# Patient Record
Sex: FEMALE | Race: WHITE | HISPANIC OR LATINO | Employment: UNEMPLOYED | URBAN - METROPOLITAN AREA
[De-identification: names, ages, dates, MRNs, and addresses within clinical notes are randomized per-mention and may not be internally consistent; named-entity substitution may affect disease eponyms.]

---
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
## 2021-02-23 ENCOUNTER — PATIENT MESSAGE (OUTPATIENT)
Dept: RHEUMATOLOGY | Facility: CLINIC | Age: 21
End: 2021-02-23

## 2021-03-24 ENCOUNTER — OFFICE VISIT (OUTPATIENT)
Dept: MEDICAL GROUP | Facility: IMAGING CENTER | Age: 21
End: 2021-03-24
Payer: COMMERCIAL

## 2021-03-24 VITALS
HEART RATE: 64 BPM | SYSTOLIC BLOOD PRESSURE: 100 MMHG | TEMPERATURE: 98.2 F | BODY MASS INDEX: 27.64 KG/M2 | WEIGHT: 156 LBS | OXYGEN SATURATION: 97 % | DIASTOLIC BLOOD PRESSURE: 64 MMHG | HEIGHT: 63 IN | RESPIRATION RATE: 12 BRPM

## 2021-03-24 DIAGNOSIS — Z13.1 SCREENING FOR DIABETES MELLITUS (DM): ICD-10-CM

## 2021-03-24 DIAGNOSIS — F33.2 SEVERE EPISODE OF RECURRENT MAJOR DEPRESSIVE DISORDER, WITHOUT PSYCHOTIC FEATURES (HCC): ICD-10-CM

## 2021-03-24 DIAGNOSIS — Z13.0 SCREENING FOR DEFICIENCY ANEMIA: ICD-10-CM

## 2021-03-24 DIAGNOSIS — F41.9 ANXIETY: ICD-10-CM

## 2021-03-24 PROCEDURE — 99214 OFFICE O/P EST MOD 30 MIN: CPT | Performed by: NURSE PRACTITIONER

## 2021-03-24 RX ORDER — ESCITALOPRAM OXALATE 10 MG/1
10 TABLET ORAL DAILY
Qty: 90 TABLET | Refills: 0 | Status: SHIPPED | OUTPATIENT
Start: 2021-03-24 | End: 2022-03-23

## 2021-03-24 ASSESSMENT — PATIENT HEALTH QUESTIONNAIRE - PHQ9
SUM OF ALL RESPONSES TO PHQ QUESTIONS 1-9: 19
CLINICAL INTERPRETATION OF PHQ2 SCORE: 3
5. POOR APPETITE OR OVEREATING: 3 - NEARLY EVERY DAY

## 2021-03-24 ASSESSMENT — ANXIETY QUESTIONNAIRES
GAD7 TOTAL SCORE: 18
5. BEING SO RESTLESS THAT IT IS HARD TO SIT STILL: NOT AT ALL
6. BECOMING EASILY ANNOYED OR IRRITABLE: NEARLY EVERY DAY
2. NOT BEING ABLE TO STOP OR CONTROL WORRYING: NEARLY EVERY DAY
4. TROUBLE RELAXING: NEARLY EVERY DAY
1. FEELING NERVOUS, ANXIOUS, OR ON EDGE: NEARLY EVERY DAY
3. WORRYING TOO MUCH ABOUT DIFFERENT THINGS: NEARLY EVERY DAY
7. FEELING AFRAID AS IF SOMETHING AWFUL MIGHT HAPPEN: NEARLY EVERY DAY

## 2021-03-24 ASSESSMENT — PAIN SCALES - GENERAL: PAINLEVEL: NO PAIN

## 2021-03-24 NOTE — PROGRESS NOTES
Subjective:   CC: Depression (worsening ) and Anxiety    HPI:   Herman presents today to discuss:    Severe episode of recurrent major depressive disorder, without psychotic features (HCC)  Reports that she has noticed worsening in her mood since October 2020 and would like to discuss starting medication to manage mood today.  States that she has seen a counselor regularly.  States that her counselor mentioned it might help her stabilize her mood to start medication for anxiety and depression.  States that she is noticing swings in her mood and it is affecting her relationships with family and friends.  States that she becomes angry and quickly depressed, as well as, feeling anxiety and than sadness.  States that she has had thoughts of hurting herself or others,  but denies having a plan at this time.  Reports history of attempting to hurt herself.  Denies any past hospitalization due to mood.  Denies history of bipolar and/or bipolar disease in the family.     Depression Screening  Little interest or pleasure in doing things?  1 - several days   Feeling down, depressed , or hopeless? 2 - more than half the days   Trouble falling or staying asleep, or sleeping too much?  3 - nearly every day   Feeling tired or having little energy?  3 - nearly every day   Poor appetite or overeating?  3 - nearly every day   Feeling bad about yourself - or that you are a failure or have let yourself or your family down? 3 - nearly every day   Trouble concentrating on things, such as reading the newspaper or watching television? 2 - more than half the days   Moving or speaking so slowly that other people could have noticed.  Or the opposite - being so fidgety or restless that you have been moving around a lot more than usual?  0 - not at all   Thoughts that you would be better off dead, or of hurting yourself?  2 - more than half the days   Patient Health Questionnaire Score: 19     If depressive symptoms identified deferred to  follow up visit unless specifically addressed in assesment and plan.    Interpretation of PHQ-9 Total Score   Score Severity   1-4 No Depression   5-9 Mild Depression   10-14 Moderate Depression   15-19 Moderately Severe Depression   20-27 Severe Depression    PJ-7 Questionnaire  Feeling nervous, anxious, or on edge: Nearly every day  Not being able to sop or control worrying: Nearly every day  Worrying too much about different things: Nearly every day  Trouble relaxing: Nearly every day  Being so restless that it's hard to sit still: Not at all  Becoming easily annoyed or irritable: Nearly every day  Feeling afraid as if something awful might happen: Nearly every day  Total: 18    Interpretation of PJ 7 Total Score   Score Severity :  0-4 No Anxiety   5-9 Mild Anxiety  10-14 Moderate Anxiety  15-21 Severe Anxiety        Past Medical History:   Diagnosis Date   • Allergy     spring     Social History     Tobacco Use   • Smoking status: Never Smoker   • Smokeless tobacco: Never Used   Substance Use Topics   • Alcohol use: Never   • Drug use: Never     Current Outpatient Medications Ordered in Epic   Medication Sig Dispense Refill   • escitalopram (LEXAPRO) 10 MG Tab Take 1 tablet by mouth every day. 90 tablet 0   • levonorgestrel-ethinyl estradiol (AVIANE) 0.1-20 MG-MCG per tablet TAKE 1 TABLET BY MOUTH EVERY DAY 84 Tab 2   • Probiotic Product (PROBIOTIC PO) Take  by mouth.       No current Epic-ordered facility-administered medications on file.      ROS:  Constitutional: Denies fever, chills, night sweats, weight loss/gain or malaise/fatigue.   HENT: Denies nasal congestion, sore throat, hearing loss, enlarged thyroid, or difficulty swallowing.   Eyes: Denies changes in vision, pain.   Respiratory: Denies cough, SOB at rest or activity.    Cardiovascular: Denies tachycardia, chest pain, palpitations, or leg swelling.   Gastrointestinal: Denies N/V/C/D, abdominal pain, loss appetite, reflux, or  "hematochezia.  Genitourinary: Denies difficulty voiding, dysuria, nocturia, or hematuria. History of painful menstrual cycle with heavy flow.  Skin: Negative for rash or worrisome moles.   Neurological: Negative for dizziness, focal weakness and headaches.   Endo/Heme/Allergies: Denies bruise/bleed easily, allergies.   Psychiatric/Behavioral: Denies difficulty sleeping. Depression and anxiety, see HPI.    Objective:   Exam:  /64   Pulse 64   Temp 36.8 °C (98.2 °F)   Resp 12   Ht 1.6 m (5' 3\")   Wt 70.8 kg (156 lb)   LMP 03/18/2021 (Exact Date)   SpO2 97%   BMI 27.63 kg/m²  Body mass index is 27.63 kg/m².  General: Normal appearing. No distress.  HEENT: Normocephalic. Eyes conjunctiva clear lids without ptosis, PERRLA, ears normal shape and contour. Oral, ears, and nasal examine deferred due to current COVID-19 outbreak, no acute concerns. Patient wore a mask during visit.  Neck: Trachea midline, no masses, no thyromegaly.  Pulmonary:  Unlabored respiratory effort, no cough.  Neurologic: Grossly non-focal.  Skin: Warm and dry. No obvious lesions.  Musculoskeletal: Normal gait. No extremity cyanosis, clubbing, or edema.   Psych: Normal mood and affect. Alert and oriented x3. Judgment and insight is normal.    Assessment & Plan:   1. Anxiety  2. Severe episode of recurrent major depressive disorder, without psychotic features (HCC)  This is a stable condition. Reviewed PJ and PHQ9 results with patient, verbalized understanding. Discussed the benefit of improving diet and exercise regimen,  verbalized understanding. Discussed the benefit of treating depression and risk of not treating ongoing depression with patient. Discussed beside ongoing life stressor as cause of depression, Vitamin D deficiency, anemia, or hypothyroid as possible causes of depression, verbalized understanding. Discussed starting medication therapy today, verbalized understanding and desire. Discussed the importance of adjunctive " counseling with treatment and instructed to continue, verbalized understanding and willingness. Instructed to take Lexapro 10 mg daily for 14 days, and then may increase to 20 mg daily if tolerating and symptoms are mildly improved, verbalized understanding. Discussed possible side effects of medication and/or anxiety and depression might get worse before getting better, verbalized understanding. Encouraged to notify a trusted family member or friend of treatment for depression due to possible worsening of depression with treatment. Instructed to RTC if symptoms worsen or medication doesn't start elevate mood within 14 days. Instructed to seek emergency services is she has suicidal/homicide thoughts before returning to clinic.    -     escitalopram (LEXAPRO) 10 MG Tab; Take 1 tablet by mouth every day.  -     CBC WITH DIFFERENTIAL; Future  -     TSH WITH REFLEX TO FT4; Future  -     VITAMIN D,25 HYDROXY; Future    3. Screening for deficiency anemia  4. Screening for diabetes mellitus (DM)  Discussed with patient ruling out anemia as cause of change in mood, as well as, screening for diabetes and assessing electrolytes due to be on oral birth control, verbalized understanding and willingness.  Discussed preventive screening labs with patient, verbalized understanding. Will evaluate plan of care once labs are obtained.    -     Comp Metabolic Panel; Future        -     CBC WITH DIFFERENTIAL; Future    Return in about 4 weeks (around 4/21/2021).    Please note that this dictation was created using voice recognition software. I have made every reasonable attempt to correct obvious errors, but I expect that there are errors of grammar and possibly content that I did not discover before finalizing the note.

## 2021-03-26 PROBLEM — F41.9 ANXIETY: Status: ACTIVE | Noted: 2021-03-26

## 2021-03-26 PROBLEM — F33.2 SEVERE EPISODE OF RECURRENT MAJOR DEPRESSIVE DISORDER, WITHOUT PSYCHOTIC FEATURES (HCC): Status: ACTIVE | Noted: 2021-03-26

## 2021-03-26 NOTE — ASSESSMENT & PLAN NOTE
Reports that she has noticed worsening in her mood since October 2020 and would like to discuss starting medication to manage mood today.  States that she has seen a counselor regularly.  States that her counselor mentioned it might help her stabilize her mood to start medication for anxiety and depression.  States that she is noticing swings in her mood and it is affecting her relationships with family and friends.  States that she becomes angry and quickly depressed, as well as, feeling anxiety and than sadness.  States that she has had thoughts of hurting herself or others,  but denies having a plan at this time.  Reports history of attempting to hurt herself.  Denies any past hospitalization due to mood.  Denies history of bipolar and/or bipolar disease in the family.     Depression Screening  Little interest or pleasure in doing things?  1 - several days   Feeling down, depressed , or hopeless? 2 - more than half the days   Trouble falling or staying asleep, or sleeping too much?  3 - nearly every day   Feeling tired or having little energy?  3 - nearly every day   Poor appetite or overeating?  3 - nearly every day   Feeling bad about yourself - or that you are a failure or have let yourself or your family down? 3 - nearly every day   Trouble concentrating on things, such as reading the newspaper or watching television? 2 - more than half the days   Moving or speaking so slowly that other people could have noticed.  Or the opposite - being so fidgety or restless that you have been moving around a lot more than usual?  0 - not at all   Thoughts that you would be better off dead, or of hurting yourself?  2 - more than half the days   Patient Health Questionnaire Score: 19     If depressive symptoms identified deferred to follow up visit unless specifically addressed in assesment and plan.    Interpretation of PHQ-9 Total Score   Score Severity   1-4 No Depression   5-9 Mild Depression   10-14 Moderate Depression    15-19 Moderately Severe Depression   20-27 Severe Depression    PJ-7 Questionnaire  Feeling nervous, anxious, or on edge: Nearly every day  Not being able to sop or control worrying: Nearly every day  Worrying too much about different things: Nearly every day  Trouble relaxing: Nearly every day  Being so restless that it's hard to sit still: Not at all  Becoming easily annoyed or irritable: Nearly every day  Feeling afraid as if something awful might happen: Nearly every day  Total: 18    Interpretation of PJ 7 Total Score   Score Severity :  0-4 No Anxiety   5-9 Mild Anxiety  10-14 Moderate Anxiety  15-21 Severe Anxiety

## 2021-04-15 ENCOUNTER — PATIENT MESSAGE (OUTPATIENT)
Dept: RESEARCH | Facility: HOSPITAL | Age: 21
End: 2021-04-15

## 2021-04-29 ENCOUNTER — OFFICE VISIT (OUTPATIENT)
Dept: MEDICAL GROUP | Facility: IMAGING CENTER | Age: 21
End: 2021-04-29
Payer: COMMERCIAL

## 2021-04-29 VITALS
SYSTOLIC BLOOD PRESSURE: 100 MMHG | TEMPERATURE: 99.2 F | HEART RATE: 74 BPM | BODY MASS INDEX: 27.82 KG/M2 | OXYGEN SATURATION: 99 % | HEIGHT: 63 IN | WEIGHT: 157 LBS | DIASTOLIC BLOOD PRESSURE: 62 MMHG | RESPIRATION RATE: 16 BRPM

## 2021-04-29 DIAGNOSIS — F33.41 RECURRENT MAJOR DEPRESSIVE DISORDER, IN PARTIAL REMISSION (HCC): ICD-10-CM

## 2021-04-29 DIAGNOSIS — F41.9 ANXIETY: ICD-10-CM

## 2021-04-29 PROCEDURE — 99213 OFFICE O/P EST LOW 20 MIN: CPT | Performed by: NURSE PRACTITIONER

## 2021-04-29 RX ORDER — ESCITALOPRAM OXALATE 10 MG/1
10 TABLET ORAL DAILY
Qty: 90 TABLET | Refills: 2 | Status: SHIPPED | OUTPATIENT
Start: 2021-04-29 | End: 2022-03-23 | Stop reason: SDUPTHER

## 2021-04-29 ASSESSMENT — PAIN SCALES - GENERAL: PAINLEVEL: NO PAIN

## 2021-04-29 ASSESSMENT — ANXIETY QUESTIONNAIRES
4. TROUBLE RELAXING: NOT AT ALL
2. NOT BEING ABLE TO STOP OR CONTROL WORRYING: NOT AT ALL
GAD7 TOTAL SCORE: 0
7. FEELING AFRAID AS IF SOMETHING AWFUL MIGHT HAPPEN: NOT AT ALL
6. BECOMING EASILY ANNOYED OR IRRITABLE: NOT AT ALL
1. FEELING NERVOUS, ANXIOUS, OR ON EDGE: NOT AT ALL
3. WORRYING TOO MUCH ABOUT DIFFERENT THINGS: NOT AT ALL
5. BEING SO RESTLESS THAT IT IS HARD TO SIT STILL: NOT AT ALL

## 2021-04-29 ASSESSMENT — PATIENT HEALTH QUESTIONNAIRE - PHQ9
5. POOR APPETITE OR OVEREATING: 2 - MORE THAN HALF THE DAYS
CLINICAL INTERPRETATION OF PHQ2 SCORE: 1
SUM OF ALL RESPONSES TO PHQ QUESTIONS 1-9: 5

## 2021-05-02 PROBLEM — F33.2 SEVERE EPISODE OF RECURRENT MAJOR DEPRESSIVE DISORDER, WITHOUT PSYCHOTIC FEATURES (HCC): Status: RESOLVED | Noted: 2021-03-26 | Resolved: 2021-05-02

## 2021-05-02 PROBLEM — F33.41 RECURRENT MAJOR DEPRESSIVE DISORDER, IN PARTIAL REMISSION (HCC): Status: ACTIVE | Noted: 2021-05-02

## 2021-05-02 NOTE — ASSESSMENT & PLAN NOTE
Reports that her overall mood has improved since starting Lexapro 10 mg daily.  States she is taking medication at night.  Denies any side effects from medication.  States overall she is happy with her improvement of her mood since starting Lexapro.    Depression Screening  Little interest or pleasure in doing things?  0 - not at all   Feeling down, depressed , or hopeless? 1 - several days   Trouble falling or staying asleep, or sleeping too much?  0 - not at all   Feeling tired or having little energy?  1 - several days   Poor appetite or overeating?  2 - more than half the days   Feeling bad about yourself - or that you are a failure or have let yourself or your family down? 1 - several days   Trouble concentrating on things, such as reading the newspaper or watching television? 0 - not at all   Moving or speaking so slowly that other people could have noticed.  Or the opposite - being so fidgety or restless that you have been moving around a lot more than usual?  0 - not at all   Thoughts that you would be better off dead, or of hurting yourself?  0 - not at all   Patient Health Questionnaire Score: 5     If depressive symptoms identified deferred to follow up visit unless specifically addressed in assesment and plan.    Interpretation of PHQ-9 Total Score   Score Severity   1-4 No Depression   5-9 Mild Depression   10-14 Moderate Depression   15-19 Moderately Severe Depression   20-27 Severe Depression    PJ-7 Questionnaire  Feeling nervous, anxious, or on edge: Not at all  Not being able to sop or control worrying: Not at all  Worrying too much about different things: Not at all  Trouble relaxing: Not at all  Being so restless that it's hard to sit still: Not at all  Becoming easily annoyed or irritable: Not at all  Feeling afraid as if something awful might happen: Not at all  Total: 0    Interpretation of PJ 7 Total Score   Score Severity :  0-4 No Anxiety   5-9 Mild Anxiety  10-14 Moderate Anxiety  15-21  Severe Anxiety    3/24/2021 previous leoncio: 18, PHQ: 19

## 2021-05-02 NOTE — PROGRESS NOTES
Subjective:   CC: Depression and Anxiety    HPI:   Herman presents today to discuss:    Anxiety  Reports that her overall mood has improved since starting Lexapro 10 mg daily.  States she is taking medication at night.  Denies any side effects from medication.  States overall she is happy with her improvement of her mood since starting Lexapro.    Depression Screening  Little interest or pleasure in doing things?  0 - not at all   Feeling down, depressed , or hopeless? 1 - several days   Trouble falling or staying asleep, or sleeping too much?  0 - not at all   Feeling tired or having little energy?  1 - several days   Poor appetite or overeating?  2 - more than half the days   Feeling bad about yourself - or that you are a failure or have let yourself or your family down? 1 - several days   Trouble concentrating on things, such as reading the newspaper or watching television? 0 - not at all   Moving or speaking so slowly that other people could have noticed.  Or the opposite - being so fidgety or restless that you have been moving around a lot more than usual?  0 - not at all   Thoughts that you would be better off dead, or of hurting yourself?  0 - not at all   Patient Health Questionnaire Score: 5     If depressive symptoms identified deferred to follow up visit unless specifically addressed in assesment and plan.    Interpretation of PHQ-9 Total Score   Score Severity   1-4 No Depression   5-9 Mild Depression   10-14 Moderate Depression   15-19 Moderately Severe Depression   20-27 Severe Depression    PJ-7 Questionnaire  Feeling nervous, anxious, or on edge: Not at all  Not being able to sop or control worrying: Not at all  Worrying too much about different things: Not at all  Trouble relaxing: Not at all  Being so restless that it's hard to sit still: Not at all  Becoming easily annoyed or irritable: Not at all  Feeling afraid as if something awful might happen: Not at all  Total: 0    Interpretation of PJ 7  Total Score   Score Severity :  0-4 No Anxiety   5-9 Mild Anxiety  10-14 Moderate Anxiety  15-21 Severe Anxiety    3/24/2021 previous leoncio: 18, PHQ: 19    Past Medical History:   Diagnosis Date   • Allergy     spring   • Severe episode of recurrent major depressive disorder, without psychotic features (HCC) 3/26/2021     Social History     Tobacco Use   • Smoking status: Never Smoker   • Smokeless tobacco: Never Used   Substance Use Topics   • Alcohol use: Never   • Drug use: Never     Current Outpatient Medications Ordered in Epic   Medication Sig Dispense Refill   • escitalopram (LEXAPRO) 10 MG Tab Take 1 tablet by mouth every day. 90 tablet 2   • escitalopram (LEXAPRO) 10 MG Tab Take 1 tablet by mouth every day. 90 tablet 0   • levonorgestrel-ethinyl estradiol (AVIANE) 0.1-20 MG-MCG per tablet TAKE 1 TABLET BY MOUTH EVERY DAY 84 Tab 2   • Probiotic Product (PROBIOTIC PO) Take  by mouth.       No current Epic-ordered facility-administered medications on file.     Allergies:  Patient has no known allergies.    ROS:  Constitutional: Denies fever, chills, night sweats, weight loss/gain or malaise/fatigue.   HENT: Denies nasal congestion, sore throat, hearing loss, enlarged thyroid, or difficulty swallowing.   Eyes: Denies changes in vision, pain.   Respiratory: Denies cough, SOB at rest or activity.    Cardiovascular: Denies tachycardia, chest pain, palpitations, or leg swelling.   Gastrointestinal: Denies N/V/C/D, abdominal pain, loss appetite, reflux, or hematochezia.  Genitourinary: Denies difficulty voiding, dysuria, nocturia, or hematuria. History of painful menstrual cycle with heavy flow.  Skin: Negative for rash or worrisome moles.   Neurological: Negative for dizziness, focal weakness and headaches.   Endo/Heme/Allergies: Denies bruise/bleed easily, allergies.   Psychiatric/Behavioral: Denies difficulty sleeping. Depression and anxiety, see HPI.    Objective:   Exam:  /62 (BP Location: Right arm,  "Patient Position: Sitting, BP Cuff Size: Adult)   Pulse 74   Temp 37.3 °C (99.2 °F) (Temporal)   Resp 16   Ht 1.6 m (5' 3\")   Wt 71.2 kg (157 lb)   SpO2 99%   BMI 27.81 kg/m²  Body mass index is 27.81 kg/m².  General: Normal appearing. No distress.  HEENT: Normocephalic. Eyes conjunctiva clear lids without ptosis, PERRLA, ears normal shape and contour. Oral and nasal examine deferred due to current COVID-19 outbreak, no acute concerns. Patient wore a mask during visit.  Neck:Trachea midline, no masses, no thyromegaly.  Pulmonary:  Unlabored respiratory effort, no cough.  Neurologic: Grossly non-focal.  Skin: Warm and dry. No obvious lesions.  Musculoskeletal: Normal gait. No extremity cyanosis, clubbing, or edema. DTR+2  Psych: Normal mood and affect. Alert and oriented x3. Judgment and insight is normal.    Assessment & Plan:   1. Anxiety  2. Recurrent major depressive disorder, in partial remission (HCC)  This is a stable improved condition. Reviewed PJ and PHQ9 results with patient, verbalized understanding. Discussed the benefit of improving diet and exercise regimen,  verbalized understanding. Discussed the importance of adjunctive counseling with treatment and instructed to continue, verbalized understanding and willingness. Instructed to continue taking Lexapro 10 mg daily. Aware of possible side effects of medication. Instructed to seek emergency services is she has suicidal/homicide thoughts before returning to clinic.    - escitalopram (LEXAPRO) 10 MG Tab; Take 1 tablet by mouth every day.  Dispense: 90 tablet; Refill: 2    Return in about 3 months (around 7/29/2021).    Please note that this dictation was created using voice recognition software. I have made every reasonable attempt to correct obvious errors, but I expect that there are errors of grammar and possibly content that I did not discover before finalizing the note.      "

## 2021-07-03 DIAGNOSIS — N94.6 DYSMENORRHEA: ICD-10-CM

## 2021-07-03 DIAGNOSIS — Z30.011 ENCOUNTER FOR ORAL CONTRACEPTION INITIAL PRESCRIPTION: ICD-10-CM

## 2021-07-08 RX ORDER — LEVONORGESTREL AND ETHINYL ESTRADIOL 0.1-0.02MG
KIT ORAL
Qty: 84 TABLET | Refills: 2 | Status: SHIPPED
Start: 2021-07-08 | End: 2022-03-21

## 2021-12-02 ENCOUNTER — OFFICE VISIT (OUTPATIENT)
Dept: URGENT CARE | Facility: CLINIC | Age: 21
End: 2021-12-02
Payer: COMMERCIAL

## 2022-03-29 ENCOUNTER — OFFICE VISIT (OUTPATIENT)
Dept: MEDICAL GROUP | Facility: IMAGING CENTER | Age: 22
End: 2022-03-29

## 2022-03-29 ENCOUNTER — HOSPITAL ENCOUNTER (OUTPATIENT)
Facility: MEDICAL CENTER | Age: 22
End: 2022-03-29
Attending: PHYSICIAN ASSISTANT

## 2022-03-29 VITALS
OXYGEN SATURATION: 98 % | HEART RATE: 76 BPM | WEIGHT: 165 LBS | SYSTOLIC BLOOD PRESSURE: 108 MMHG | HEIGHT: 63 IN | DIASTOLIC BLOOD PRESSURE: 56 MMHG | TEMPERATURE: 98.7 F | BODY MASS INDEX: 29.23 KG/M2

## 2022-03-29 DIAGNOSIS — Z76.89 ENCOUNTER TO ESTABLISH CARE: ICD-10-CM

## 2022-03-29 DIAGNOSIS — Z30.011 ENCOUNTER FOR ORAL CONTRACEPTION INITIAL PRESCRIPTION: ICD-10-CM

## 2022-03-29 DIAGNOSIS — Z13.220 SCREENING CHOLESTEROL LEVEL: ICD-10-CM

## 2022-03-29 DIAGNOSIS — Z13.0 SCREENING FOR DEFICIENCY ANEMIA: ICD-10-CM

## 2022-03-29 DIAGNOSIS — Z13.1 DIABETES MELLITUS SCREENING: ICD-10-CM

## 2022-03-29 DIAGNOSIS — Z13.29 SCREENING FOR THYROID DISORDER: ICD-10-CM

## 2022-03-29 DIAGNOSIS — G47.9 DIFFICULTY SLEEPING: ICD-10-CM

## 2022-03-29 DIAGNOSIS — F33.1 MODERATE EPISODE OF RECURRENT MAJOR DEPRESSIVE DISORDER (HCC): ICD-10-CM

## 2022-03-29 DIAGNOSIS — F41.9 ANXIETY: ICD-10-CM

## 2022-03-29 DIAGNOSIS — Z13.21 ENCOUNTER FOR VITAMIN DEFICIENCY SCREENING: ICD-10-CM

## 2022-03-29 DIAGNOSIS — N94.6 DYSMENORRHEA: ICD-10-CM

## 2022-03-29 DIAGNOSIS — Z11.3 SCREENING EXAMINATION FOR SEXUALLY TRANSMITTED DISEASE: ICD-10-CM

## 2022-03-29 PROCEDURE — 87491 CHLMYD TRACH DNA AMP PROBE: CPT

## 2022-03-29 PROCEDURE — 99214 OFFICE O/P EST MOD 30 MIN: CPT | Performed by: PHYSICIAN ASSISTANT

## 2022-03-29 PROCEDURE — 87510 GARDNER VAG DNA DIR PROBE: CPT

## 2022-03-29 PROCEDURE — 87660 TRICHOMONAS VAGIN DIR PROBE: CPT

## 2022-03-29 PROCEDURE — 87480 CANDIDA DNA DIR PROBE: CPT

## 2022-03-29 PROCEDURE — 87591 N.GONORRHOEAE DNA AMP PROB: CPT

## 2022-03-29 RX ORDER — TRAZODONE HYDROCHLORIDE 50 MG/1
50 TABLET ORAL NIGHTLY PRN
Qty: 30 TABLET | Refills: 0 | Status: SHIPPED | OUTPATIENT
Start: 2022-03-29 | End: 2022-05-02

## 2022-03-29 RX ORDER — LEVONORGESTREL AND ETHINYL ESTRADIOL 0.1-0.02MG
1 KIT ORAL
Qty: 28 TABLET | Refills: 11 | Status: SHIPPED | OUTPATIENT
Start: 2022-03-29 | End: 2022-10-25 | Stop reason: SDUPTHER

## 2022-03-29 ASSESSMENT — ANXIETY QUESTIONNAIRES
5. BEING SO RESTLESS THAT IT IS HARD TO SIT STILL: MORE THAN HALF THE DAYS
3. WORRYING TOO MUCH ABOUT DIFFERENT THINGS: NEARLY EVERY DAY
4. TROUBLE RELAXING: NEARLY EVERY DAY
2. NOT BEING ABLE TO STOP OR CONTROL WORRYING: NEARLY EVERY DAY
7. FEELING AFRAID AS IF SOMETHING AWFUL MIGHT HAPPEN: NOT AT ALL
6. BECOMING EASILY ANNOYED OR IRRITABLE: SEVERAL DAYS
GAD7 TOTAL SCORE: 14
1. FEELING NERVOUS, ANXIOUS, OR ON EDGE: MORE THAN HALF THE DAYS

## 2022-03-29 ASSESSMENT — PAIN SCALES - GENERAL: PAINLEVEL: NO PAIN

## 2022-03-29 ASSESSMENT — PATIENT HEALTH QUESTIONNAIRE - PHQ9
SUM OF ALL RESPONSES TO PHQ QUESTIONS 1-9: 10
CLINICAL INTERPRETATION OF PHQ2 SCORE: 2
5. POOR APPETITE OR OVEREATING: 2 - MORE THAN HALF THE DAYS

## 2022-03-29 NOTE — ASSESSMENT & PLAN NOTE
Controlled on birth control.  Requesting refill today.  Patient also requesting STD screening as she has a new sexual partner and does not wear condoms during every sexual interaction.

## 2022-03-29 NOTE — PROGRESS NOTES
Subjective:     CC:   Chief Complaint   Patient presents with   • Establish Care   • Exposure to STD     Pt requesting std screening       HPI:   Herman presents today to discuss:    Moderate episode of recurrent major depressive disorder (HCC)  Patient admits to worsening of her depression and anxiety over the past several months.  She states that a lot of her symptoms are situational due to her feeling of lack of purpose in life.  She had dropped out of school as she had difficulty with online school.  She is currently working nights at [employer].  She has been having difficulty falling asleep as she feels like she cannot relax.  She wakes up several times throughout the night worried about several different things.  She says that she sleeps around 4 to 5 hours a night.  Despite this she does feel well rested during the day.  She has occasional irritability and anger.  She has gone to therapy in the past but had difficulty with her schedule.  No history of self-harm, stpehen, suicidal ideation, eating disorder in childhood.    Dysmenorrhea  Controlled on birth control.  Requesting refill today.  Patient also requesting STD screening as she has a new sexual partner and does not wear condoms during every sexual interaction.      Past Medical History:   Diagnosis Date   • Allergy     spring   • Severe episode of recurrent major depressive disorder, without psychotic features (HCC) 3/26/2021     Family History   Problem Relation Age of Onset   • Cancer Maternal Grandfather         throat    • Heart Disease Maternal Grandfather    • GI Disease Mother         Crohn's   • Heart Disease Paternal Uncle    • Hyperlipidemia Paternal Grandmother      History reviewed. No pertinent surgical history.  Social History     Tobacco Use   • Smoking status: Never Smoker   • Smokeless tobacco: Never Used   Vaping Use   • Vaping Use: Never used   Substance Use Topics   • Alcohol use: Never   • Drug use: Never     Social History  "    Social History Narrative    Born and raised in [place]    Played softball and basketball growing up    H/O concussions    H/O right shoulder injury s/p PT due to softball    Working at [employer] - occasional knee pain at work     Current Outpatient Medications Ordered in Epic   Medication Sig Dispense Refill   • traZODone (DESYREL) 50 MG Tab Take 1 Tablet by mouth at bedtime as needed for Sleep. 30 Tablet 0   • levonorgestrel-ethinyl estradiol (SRONYX) 0.1-20 MG-MCG per tablet Take 1 Tablet by mouth every day. 28 Tablet 11   • escitalopram (LEXAPRO) 10 MG Tab Take 1 Tablet by mouth every day. 30 Tablet 0   • Probiotic Product (PROBIOTIC PO) Take  by mouth.       No current Epic-ordered facility-administered medications on file.     Patient has no known allergies.    Health Maintenance: Pap and annual later this year    ROS: see hpi  Gen: no fevers/chills  Pulm: no sob, no cough  CV: no chest pain, no palpitations, no edema  GI: no nausea/vomiting, no diarrhea  Skin: no rash    Objective:   Exam:  /56 (BP Location: Left arm, Patient Position: Sitting, BP Cuff Size: Adult)   Pulse 76   Temp 37.1 °C (98.7 °F) (Temporal)   Ht 1.6 m (5' 3\")   Wt 74.8 kg (165 lb)   LMP 03/24/2022 (Exact Date)   SpO2 98%   BMI 29.23 kg/m²    Body mass index is 29.23 kg/m².    Gen: Alert and oriented, No apparent distress.  HEENT: Head atraumatic, normocephalic. Pupils equal and round.  Neck: Neck is supple without lymphadenopathy.   Lungs: Normal effort, CTA bilaterally, no wheezes, rhonchi, or rales  CV: Regular rate and rhythm. No murmurs, rubs, or gallops.  Ext: No clubbing, cyanosis, edema.    Assessment & Plan:     22 y.o. female with the following -     1. Encounter to establish care  Pleasant 22-year-old female here to establish care.  History reviewed.  Pap smear and annual later this year.    2. Moderate episode of recurrent major depressive disorder (HCC)  Chronic, uncontrolled.  Continue Lexapro 10 mg daily.  We " will add trazodone 50 mg nightly for sleep and reassess in 3 weeks.    3. Anxiety  Chronic, uncontrolled.  Continue Lexapro 10 mg daily and add trazodone 50 mg nightly for sleep.  If symptoms persist may consider increasing Lexapro versus hydroxyzine versus lamotrigine for mood stability/irritability/racing thoughts.  - TSH WITH REFLEX TO FT4; Future    4. Difficulty sleeping  - traZODone (DESYREL) 50 MG Tab; Take 1 Tablet by mouth at bedtime as needed for Sleep.  Dispense: 30 Tablet; Refill: 0    5. Dysmenorrhea  - levonorgestrel-ethinyl estradiol (SRONYX) 0.1-20 MG-MCG per tablet; Take 1 Tablet by mouth every day.  Dispense: 28 Tablet; Refill: 11    6. Screening examination for sexually transmitted disease  Please wear condoms during sexual intercourse to reduce risk of STD exposure and transmission.  - Chlamydia & N.gonorrhoeae by PCR; Future  - VAGINAL PATHOGENS DNA PANEL; Future    7. Screening for thyroid disorder  - TSH WITH REFLEX TO FT4; Future    8. Screening cholesterol level  - Lipid Profile; Future    9. Diabetes mellitus screening  - Comp Metabolic Panel; Future    10. Encounter for vitamin deficiency screening  - VITAMIN B12; Future  - VITAMIN D,25 HYDROXY; Future    11. Screening for deficiency anemia  - CBC WITH DIFFERENTIAL; Future    12. Encounter for oral contraception initial prescription  - levonorgestrel-ethinyl estradiol (SRONYX) 0.1-20 MG-MCG per tablet; Take 1 Tablet by mouth every day.  Dispense: 28 Tablet; Refill: 11    Return in about 3 weeks (around 4/19/2022) for Follow-up anxiety, sleep, depression.    Elena Nieves PA-C (Baker)  Physician Assistant Certified  [facility]    Please note that this dictation was created using voice recognition software. I have made every reasonable attempt to correct obvious errors, but I expect that there are errors of grammar and possibly content that I did not discover before finalizing the note.

## 2022-03-29 NOTE — ASSESSMENT & PLAN NOTE
Patient admits to worsening of her depression and anxiety over the past several months.  She states that a lot of her symptoms are situational due to her feeling of lack of purpose in life.  She had dropped out of school as she had difficulty with online school.  She is currently working nights at [employer].  She has been having difficulty falling asleep as she feels like she cannot relax.  She wakes up several times throughout the night worried about several different things.  She says that she sleeps around 4 to 5 hours a night.  Despite this she does feel well rested during the day.  She has occasional irritability and anger.  She has gone to therapy in the past but had difficulty with her schedule.  No history of self-harm, stephen, suicidal ideation, eating disorder in childhood.

## 2022-03-30 LAB
C TRACH DNA GENITAL QL NAA+PROBE: POSITIVE
CANDIDA DNA VAG QL PROBE+SIG AMP: NEGATIVE
G VAGINALIS DNA VAG QL PROBE+SIG AMP: NEGATIVE
N GONORRHOEA DNA GENITAL QL NAA+PROBE: NEGATIVE
SPECIMEN SOURCE: ABNORMAL
T VAGINALIS DNA VAG QL PROBE+SIG AMP: NEGATIVE

## 2022-03-31 DIAGNOSIS — A74.9 CHLAMYDIA: ICD-10-CM

## 2022-03-31 RX ORDER — AZITHROMYCIN 1 G/1
1 POWDER, FOR SUSPENSION ORAL ONCE
Qty: 1 EACH | Refills: 0 | Status: SHIPPED | OUTPATIENT
Start: 2022-03-31 | End: 2022-03-31

## 2022-05-01 DIAGNOSIS — G47.9 DIFFICULTY SLEEPING: ICD-10-CM

## 2022-05-02 RX ORDER — TRAZODONE HYDROCHLORIDE 50 MG/1
TABLET ORAL
Qty: 90 TABLET | Refills: 1 | Status: SHIPPED | OUTPATIENT
Start: 2022-05-02 | End: 2022-09-19 | Stop reason: SDUPTHER

## 2022-08-18 DIAGNOSIS — F41.9 ANXIETY: ICD-10-CM

## 2022-08-18 DIAGNOSIS — F33.41 RECURRENT MAJOR DEPRESSIVE DISORDER, IN PARTIAL REMISSION (HCC): ICD-10-CM

## 2022-08-18 RX ORDER — ESCITALOPRAM OXALATE 10 MG/1
10 TABLET ORAL DAILY
Qty: 30 TABLET | Refills: 0 | Status: SHIPPED | OUTPATIENT
Start: 2022-08-18 | End: 2022-09-15

## 2022-09-19 DIAGNOSIS — G47.9 DIFFICULTY SLEEPING: ICD-10-CM

## 2022-09-19 RX ORDER — TRAZODONE HYDROCHLORIDE 50 MG/1
50 TABLET ORAL
Qty: 30 TABLET | Refills: 0 | Status: SHIPPED | OUTPATIENT
Start: 2022-09-19 | End: 2023-07-03 | Stop reason: SDUPTHER

## 2022-11-18 DIAGNOSIS — Z30.011 ENCOUNTER FOR ORAL CONTRACEPTION INITIAL PRESCRIPTION: ICD-10-CM

## 2022-11-18 DIAGNOSIS — N94.6 DYSMENORRHEA: ICD-10-CM

## 2022-11-18 DIAGNOSIS — F41.9 ANXIETY: ICD-10-CM

## 2022-11-18 DIAGNOSIS — F33.41 RECURRENT MAJOR DEPRESSIVE DISORDER, IN PARTIAL REMISSION (HCC): ICD-10-CM

## 2022-11-18 RX ORDER — LEVONORGESTREL AND ETHINYL ESTRADIOL 0.1-0.02MG
1 KIT ORAL
Qty: 28 TABLET | Refills: 3 | Status: SHIPPED | OUTPATIENT
Start: 2022-11-18 | End: 2023-03-23 | Stop reason: SDUPTHER

## 2022-11-18 RX ORDER — ESCITALOPRAM OXALATE 10 MG/1
10 TABLET ORAL DAILY
Qty: 90 TABLET | Refills: 0 | Status: SHIPPED | OUTPATIENT
Start: 2022-11-18 | End: 2023-03-23 | Stop reason: SDUPTHER

## 2022-11-18 NOTE — TELEPHONE ENCOUNTER
Received request via: Patient    Was the patient seen in the last year in this department? Yes    Does the patient have an active prescription (recently filled or refills available) for medication(s) requested? No    Does the patient have alf Plus and need 100 day supply (blood pressure, diabetes and cholesterol meds only)? Patient does not have SCP

## 2023-07-03 ENCOUNTER — OFFICE VISIT (OUTPATIENT)
Dept: MEDICAL GROUP | Facility: IMAGING CENTER | Age: 23
End: 2023-07-03
Payer: COMMERCIAL

## 2023-07-03 VITALS
WEIGHT: 187 LBS | SYSTOLIC BLOOD PRESSURE: 108 MMHG | HEART RATE: 60 BPM | OXYGEN SATURATION: 97 % | DIASTOLIC BLOOD PRESSURE: 60 MMHG | HEIGHT: 63 IN | TEMPERATURE: 97.2 F | BODY MASS INDEX: 33.13 KG/M2

## 2023-07-03 DIAGNOSIS — Z13.29 SCREENING FOR THYROID DISORDER: ICD-10-CM

## 2023-07-03 DIAGNOSIS — G47.9 DIFFICULTY SLEEPING: ICD-10-CM

## 2023-07-03 DIAGNOSIS — Z13.220 SCREENING CHOLESTEROL LEVEL: ICD-10-CM

## 2023-07-03 DIAGNOSIS — Z13.0 SCREENING FOR DEFICIENCY ANEMIA: ICD-10-CM

## 2023-07-03 DIAGNOSIS — Z11.59 NEED FOR HEPATITIS C SCREENING TEST: ICD-10-CM

## 2023-07-03 DIAGNOSIS — F33.1 MODERATE EPISODE OF RECURRENT MAJOR DEPRESSIVE DISORDER (HCC): ICD-10-CM

## 2023-07-03 DIAGNOSIS — F41.9 ANXIETY: ICD-10-CM

## 2023-07-03 DIAGNOSIS — Z13.1 DIABETES MELLITUS SCREENING: ICD-10-CM

## 2023-07-03 DIAGNOSIS — Z23 NEED FOR VACCINATION: ICD-10-CM

## 2023-07-03 PROBLEM — A74.9 CHLAMYDIA: Status: RESOLVED | Noted: 2022-03-31 | Resolved: 2023-07-03

## 2023-07-03 PROCEDURE — 3074F SYST BP LT 130 MM HG: CPT | Performed by: PHYSICIAN ASSISTANT

## 2023-07-03 PROCEDURE — 90715 TDAP VACCINE 7 YRS/> IM: CPT | Performed by: PHYSICIAN ASSISTANT

## 2023-07-03 PROCEDURE — 99214 OFFICE O/P EST MOD 30 MIN: CPT | Mod: 25 | Performed by: PHYSICIAN ASSISTANT

## 2023-07-03 PROCEDURE — 90471 IMMUNIZATION ADMIN: CPT | Performed by: PHYSICIAN ASSISTANT

## 2023-07-03 PROCEDURE — 3078F DIAST BP <80 MM HG: CPT | Performed by: PHYSICIAN ASSISTANT

## 2023-07-03 RX ORDER — TRAZODONE HYDROCHLORIDE 50 MG/1
25 TABLET ORAL
Qty: 45 TABLET | Refills: 0 | Status: SHIPPED | OUTPATIENT
Start: 2023-07-03 | End: 2023-10-16 | Stop reason: SDUPTHER

## 2023-07-03 RX ORDER — LAMOTRIGINE 25 MG/1
TABLET ORAL
Qty: 40 TABLET | Refills: 0 | Status: SHIPPED | OUTPATIENT
Start: 2023-07-03 | End: 2023-07-28

## 2023-07-03 ASSESSMENT — PATIENT HEALTH QUESTIONNAIRE - PHQ9
SUM OF ALL RESPONSES TO PHQ QUESTIONS 1-9: 15
CLINICAL INTERPRETATION OF PHQ2 SCORE: 4
5. POOR APPETITE OR OVEREATING: 2 - MORE THAN HALF THE DAYS

## 2023-07-03 ASSESSMENT — ANXIETY QUESTIONNAIRES
5. BEING SO RESTLESS THAT IT IS HARD TO SIT STILL: SEVERAL DAYS
GAD7 TOTAL SCORE: 17
6. BECOMING EASILY ANNOYED OR IRRITABLE: NEARLY EVERY DAY
3. WORRYING TOO MUCH ABOUT DIFFERENT THINGS: NEARLY EVERY DAY
4. TROUBLE RELAXING: SEVERAL DAYS
1. FEELING NERVOUS, ANXIOUS, OR ON EDGE: NEARLY EVERY DAY
2. NOT BEING ABLE TO STOP OR CONTROL WORRYING: NEARLY EVERY DAY
7. FEELING AFRAID AS IF SOMETHING AWFUL MIGHT HAPPEN: NEARLY EVERY DAY

## 2023-07-03 NOTE — PATIENT INSTRUCTIONS
It was a pleasure meeting with you today at [facility]!    Your medical history/records and medications were reviewed today.     UPDATE on MyChart Results: If you have blood work, and/or imaging studies, or any other test or procedure completed, you will have access to results as soon as they become available in MyChart. Recently, these results will be available for review at the same time that your provider is able to see results!    This will likely mean you will see a result before your provider has had a chance to review and discuss with you.  Some results or care notes may be hard to understand and may be serious in nature.    We look at every result and your provider will contact you to explain what they mean and discuss appropriate next steps. Please allow for at least 72 business hours for chart and result review.     We prefer that you wait for your care team to contact you with your results.  Often, your provider will discuss your results with you at your next appointment. We look forward to continuing to partner with you in your care.    Please review my practice information below:    If you have any prescription refill requests, please send them via [employer] or discuss with your provider at the start of your office visits. Please allow 3-5 business days for lab and testing review and you will be contacted via [employer] with those results, or if advised to make a follow up appointment regarding those results, then please do so.     Once resulted, your lab/test/imaging results will show up automatically in your MyChart. Please wait for my interpretation and recommendations prior to viewing your results to avoid any unnecessary confusion or misinterpretation. I will address all of the lab values that I interpret as abnormal and message you accordingly on your MyChart. I will always send you a message about your results even if they are normal. If you do not hear back from me within 5-7 business  days after completing your tests, then please send me a message on [employer] so I can obtain your results (especially if you went to an outside lab or imaging center - [facility], [facility], etc).     If you have any additional questions or concerns beyond my interpretation of your results, please make an appointment with me to discuss in further detail.    Please only use the [employer] messaging system for questions regarding your most recent appointment or if advised to use otherwise (glucose or blood pressure reporting).     If you have any new problems or concerns, you must make an appointment to discuss. This includes any referral requests, lab requests (unless advised to notify me for pre-appt labs), medication side effects, or request for medication adjustments.     Please arrive 15 minutes prior to your appointment time to complete your check-in and intake with the medical assistant.      Thank you,    Elena Nieves PA-C (Baker)  Physician Assistant Certified  [facility]    -----------------------------------------------------------------    Attn: Patients of [facility]:    In an effort to continue to provide excellent and efficient care to our patients, it is vital that we continue to use our resources appropriately. With that, this is a reminder that [employer] is used for prescription refill requests, test results, virtual visits, and chart review only.     Any new questions, concerns/conditions, lab/imaging requests, medication adjustments, new prescriptions, or referral requests do require an appointment (virtually or in person), unless discussed otherwise at your most recent appointment.     Thank you for your understanding,    [facility]

## 2023-07-03 NOTE — PROGRESS NOTES
Subjective:     CC:   Chief Complaint   Patient presents with    Medication Follow-up       HPI:   Herman presents today to discuss:    Moderate episode of recurrent major depressive disorder (HCC)  Chronic, uncontrolled.  On Lexapro 10 mg daily.  Uses trazodone 25 mg as needed for sleep.  Admits to excess worry and racing thoughts at night.  Admits to episodes of anxiety throughout the day.  Occasionally irritable.  No impulsiveness.  No history of substance abuse.  Not currently meeting with a therapist, but open to a new referral.    Depression Screening    Little interest or pleasure in doing things?  2 - more than half the days   Feeling down, depressed , or hopeless? 2 - more than half the days   Trouble falling or staying asleep, or sleeping too much?  3 - nearly every day   Feeling tired or having little energy?  3 - nearly every day   Poor appetite or overeating?  2 - more than half the days   Feeling bad about yourself - or that you are a failure or have let yourself or your family down? 2 - more than half the days   Trouble concentrating on things, such as reading the newspaper or watching television? 1 - several days   Moving or speaking so slowly that other people could have noticed.  Or the opposite - being so fidgety or restless that you have been moving around a lot more than usual?  0 - not at all   Thoughts that you would be better off dead, or of hurting yourself?  0 - not at all   Patient Health Questionnaire Score: 15     PJ-7 Questionnaire    Feeling nervous, anxious, or on edge: Nearly every day  Not being able to sop or control worrying: Nearly every day  Worrying too much about different things: Nearly every day  Trouble relaxing: Several days  Being so restless that it's hard to sit still: Several days  Becoming easily annoyed or irritable: Nearly every day  Feeling afraid as if something awful might happen: Nearly every day  Total: 17      Past Medical History:   Diagnosis Date    Allergy  "    spring    Severe episode of recurrent major depressive disorder, without psychotic features ([facility]) 3/26/2021     Family History   Problem Relation Age of Onset    GI Disease Mother         Crohn's    Heart Disease Paternal Uncle     Cancer Maternal Grandfather         throat     Heart Disease Maternal Grandfather     Hyperlipidemia Paternal Grandmother      History reviewed. No pertinent surgical history.  Social History     Tobacco Use    Smoking status: Never    Smokeless tobacco: Never   Vaping Use    Vaping Use: Never used   Substance Use Topics    Alcohol use: Never    Drug use: Never     Social History     Social History Narrative    Born and raised in [place]    Played softball and basketball growing up    H/O concussions    H/O right shoulder injury s/p PT due to softball     Current Outpatient Medications Ordered in Epic   Medication Sig Dispense Refill    lamoTRIgine (LAMICTAL) 25 MG Tab Take 1 tablet daily x 2 weeks, then increase to 1 tab twice a day 40 Tablet 0    traZODone (DESYREL) 50 MG Tab Take 0.5 Tablets by mouth at bedtime as needed for Sleep. 45 Tablet 0    escitalopram (LEXAPRO) 10 MG Tab Take 1 Tablet by mouth every day. 90 Tablet 0    levonorgestrel-ethinyl estradiol (SRONYX) 0.1-20 MG-MCG per tablet Take 1 Tablet by mouth every day. 28 Tablet 3    Probiotic Product (PROBIOTIC PO) Take  by mouth.       No current Epic-ordered facility-administered medications on file.     Seasonal    PMH/PSH/FH/Social history reviewed.  Vaccinations discussed.  Previous records and labs reviewed. Discussed age appropriate anticipatory guidance.    ROS: see hpi  Gen: no fevers/chills  Pulm: no sob, no cough  CV: no chest pain, no palpitations, no edema  GI: no nausea/vomiting, no diarrhea  Skin: no rash    Objective:   Exam:  /60 (BP Location: Right arm, Patient Position: Sitting, BP Cuff Size: Adult long)   Pulse 60   Temp 36.2 °C (97.2 °F) (Temporal)   Ht 1.6 m (5' 3\")   Wt 84.8 kg (187 lb)   SpO2 " 97%   BMI 33.13 kg/m²    Body mass index is 33.13 kg/m².    Gen: Alert and oriented, No apparent distress.  HEENT: Head atraumatic, normocephalic. Pupils equal and round.  Neck: Neck is supple without lymphadenopathy.   Lungs: Normal effort, CTA bilaterally, no wheezes, rhonchi, or rales  CV: Regular rate and rhythm. No murmurs, rubs, or gallops.  Ext: No clubbing, cyanosis, edema.    Assessment & Plan:     23 y.o. female with the following -     1. Anxiety  Chronic, uncontrolled.  Add lamotrigine, titrate up.  Potential side effects include dizziness and risk of Ohara-James syndrome.  Seek medical treatment immediately if you develop a full body rash.  - lamoTRIgine (LAMICTAL) 25 MG Tab; Take 1 tablet daily x 2 weeks, then increase to 1 tab twice a day  Dispense: 40 Tablet; Refill: 0  - Referral to Psychology    2. Moderate episode of recurrent major depressive disorder (HCC)  , Uncontrolled.  Continue Lexapro and will add lamotrigine.  Follow-up in 4 weeks.  - Referral to Psychology    3. Difficulty sleeping  - traZODone (DESYREL) 50 MG Tab; Take 0.5 Tablets by mouth at bedtime as needed for Sleep.  Dispense: 45 Tablet; Refill: 0    4. Need for hepatitis C screening test  - HEP C VIRUS ANTIBODY; Future    5. Screening for thyroid disorder  - TSH WITH REFLEX TO FT4; Future    6. Screening cholesterol level  - Lipid Profile; Future    7. Diabetes mellitus screening  - Comp Metabolic Panel; Future    8. Screening for deficiency anemia  - CBC WITH DIFFERENTIAL; Future    9. Need for vaccination  - Tdap Vaccine =>6YO IM    Return in about 4 weeks (around 7/31/2023) for Medication recheck.  Preappointment lab orders given.  We will plan for annual physical fall 2023.    Elena Nieves PA-C ([place]aker)  Physician Assistant Certified  [facility]    Please note that this dictation was created using voice recognition software. I have made every reasonable attempt to correct obvious errors, but I expect that  there are errors of grammar and possibly content that I did not discover before finalizing the note.

## 2023-07-03 NOTE — ASSESSMENT & PLAN NOTE
Chronic, uncontrolled.  On Lexapro 10 mg daily.  Uses trazodone 25 mg as needed for sleep.  Admits to excess worry and racing thoughts at night.  Admits to episodes of anxiety throughout the day.  Occasionally irritable.  No impulsiveness.  No history of substance abuse.  Not currently meeting with a therapist, but open to a new referral.

## 2023-07-18 ENCOUNTER — HOSPITAL ENCOUNTER (OUTPATIENT)
Dept: LAB | Facility: MEDICAL CENTER | Age: 23
End: 2023-07-18
Attending: PHYSICIAN ASSISTANT
Payer: COMMERCIAL

## 2023-07-18 DIAGNOSIS — Z11.59 NEED FOR HEPATITIS C SCREENING TEST: ICD-10-CM

## 2023-07-18 DIAGNOSIS — Z13.29 SCREENING FOR THYROID DISORDER: ICD-10-CM

## 2023-07-18 DIAGNOSIS — Z13.1 DIABETES MELLITUS SCREENING: ICD-10-CM

## 2023-07-18 DIAGNOSIS — Z13.220 SCREENING CHOLESTEROL LEVEL: ICD-10-CM

## 2023-07-18 DIAGNOSIS — Z13.0 SCREENING FOR DEFICIENCY ANEMIA: ICD-10-CM

## 2023-07-18 LAB
ALBUMIN SERPL BCP-MCNC: 4.8 G/DL (ref 3.2–4.9)
ALBUMIN/GLOB SERPL: 2 G/DL
ALP SERPL-CCNC: 55 U/L (ref 30–99)
ALT SERPL-CCNC: 10 U/L (ref 2–50)
ANION GAP SERPL CALC-SCNC: 13 MMOL/L (ref 7–16)
AST SERPL-CCNC: 11 U/L (ref 12–45)
BASOPHILS # BLD AUTO: 1.2 % (ref 0–1.8)
BASOPHILS # BLD: 0.07 K/UL (ref 0–0.12)
BILIRUB SERPL-MCNC: 0.7 MG/DL (ref 0.1–1.5)
BUN SERPL-MCNC: 11 MG/DL (ref 8–22)
CALCIUM ALBUM COR SERPL-MCNC: 9 MG/DL (ref 8.5–10.5)
CALCIUM SERPL-MCNC: 9.6 MG/DL (ref 8.5–10.5)
CHLORIDE SERPL-SCNC: 101 MMOL/L (ref 96–112)
CHOLEST SERPL-MCNC: 240 MG/DL (ref 100–199)
CO2 SERPL-SCNC: 23 MMOL/L (ref 20–33)
CREAT SERPL-MCNC: 0.68 MG/DL (ref 0.5–1.4)
EOSINOPHIL # BLD AUTO: 0.27 K/UL (ref 0–0.51)
EOSINOPHIL NFR BLD: 4.5 % (ref 0–6.9)
ERYTHROCYTE [DISTWIDTH] IN BLOOD BY AUTOMATED COUNT: 36.9 FL (ref 35.9–50)
FASTING STATUS PATIENT QL REPORTED: NORMAL
GFR SERPLBLD CREATININE-BSD FMLA CKD-EPI: 126 ML/MIN/1.73 M 2
GLOBULIN SER CALC-MCNC: 2.4 G/DL (ref 1.9–3.5)
GLUCOSE SERPL-MCNC: 92 MG/DL (ref 65–99)
HCT VFR BLD AUTO: 41.9 % (ref 37–47)
HCV AB SER QL: NORMAL
HDLC SERPL-MCNC: 73 MG/DL
HGB BLD-MCNC: 14.1 G/DL (ref 12–16)
IMM GRANULOCYTES # BLD AUTO: 0.01 K/UL (ref 0–0.11)
IMM GRANULOCYTES NFR BLD AUTO: 0.2 % (ref 0–0.9)
LDLC SERPL CALC-MCNC: 148 MG/DL
LYMPHOCYTES # BLD AUTO: 1.31 K/UL (ref 1–4.8)
LYMPHOCYTES NFR BLD: 22.1 % (ref 22–41)
MCH RBC QN AUTO: 30.1 PG (ref 27–33)
MCHC RBC AUTO-ENTMCNC: 33.7 G/DL (ref 32.2–35.5)
MCV RBC AUTO: 89.5 FL (ref 81.4–97.8)
MONOCYTES # BLD AUTO: 0.41 K/UL (ref 0–0.85)
MONOCYTES NFR BLD AUTO: 6.9 % (ref 0–13.4)
NEUTROPHILS # BLD AUTO: 3.87 K/UL (ref 1.82–7.42)
NEUTROPHILS NFR BLD: 65.1 % (ref 44–72)
NRBC # BLD AUTO: 0 K/UL
NRBC BLD-RTO: 0 /100 WBC (ref 0–0.2)
PLATELET # BLD AUTO: 232 K/UL (ref 164–446)
PMV BLD AUTO: 11 FL (ref 9–12.9)
POTASSIUM SERPL-SCNC: 4 MMOL/L (ref 3.6–5.5)
PROT SERPL-MCNC: 7.2 G/DL (ref 6–8.2)
RBC # BLD AUTO: 4.68 M/UL (ref 4.2–5.4)
SODIUM SERPL-SCNC: 137 MMOL/L (ref 135–145)
TRIGL SERPL-MCNC: 97 MG/DL (ref 0–149)
TSH SERPL DL<=0.005 MIU/L-ACNC: 1.91 UIU/ML (ref 0.38–5.33)
WBC # BLD AUTO: 5.9 K/UL (ref 4.8–10.8)

## 2023-07-18 PROCEDURE — 86803 HEPATITIS C AB TEST: CPT

## 2023-07-18 PROCEDURE — 80061 LIPID PANEL: CPT

## 2023-07-18 PROCEDURE — 84443 ASSAY THYROID STIM HORMONE: CPT

## 2023-07-18 PROCEDURE — 85025 COMPLETE CBC W/AUTO DIFF WBC: CPT

## 2023-07-18 PROCEDURE — 80053 COMPREHEN METABOLIC PANEL: CPT

## 2023-07-18 PROCEDURE — 36415 COLL VENOUS BLD VENIPUNCTURE: CPT

## 2023-08-11 DIAGNOSIS — F41.9 ANXIETY: ICD-10-CM

## 2023-08-15 RX ORDER — LAMOTRIGINE 25 MG/1
25 TABLET ORAL 2 TIMES DAILY
Qty: 180 TABLET | Refills: 1 | Status: SHIPPED | OUTPATIENT
Start: 2023-08-15 | End: 2023-10-16 | Stop reason: SDUPTHER

## 2023-08-15 NOTE — TELEPHONE ENCOUNTER
Received request via: Pharmacy    Was the patient seen in the last year in this department? Yes    Does the patient have an active prescription (recently filled or refills available) for medication(s) requested? No    Does the patient have long term Plus and need 100 day supply (blood pressure, diabetes and cholesterol meds only)? Patient does not have SCP    Pharmacy comment: REQUEST FOR 90 DAYS PRESCRIPTION.

## 2024-01-24 DIAGNOSIS — G47.9 DIFFICULTY SLEEPING: ICD-10-CM

## 2024-01-25 RX ORDER — TRAZODONE HYDROCHLORIDE 50 MG/1
TABLET ORAL
Qty: 45 TABLET | Refills: 0 | Status: SHIPPED | OUTPATIENT
Start: 2024-01-25

## 2024-01-25 NOTE — TELEPHONE ENCOUNTER
Received request via: Patient    Was the patient seen in the last year in this department? Yes    Does the patient have an active prescription (recently filled or refills available) for medication(s) requested? No    Pharmacy Name: [facility] [address] [place] [address]    Does the patient have USP Plus and need 100 day supply (blood pressure, diabetes and cholesterol meds only)? Patient does not have SCP

## 2024-03-04 ENCOUNTER — TELEPHONE (OUTPATIENT)
Dept: MEDICAL GROUP | Facility: MEDICAL CENTER | Age: 24
End: 2024-03-04
Payer: COMMERCIAL

## 2024-03-04 DIAGNOSIS — F41.9 ANXIETY: ICD-10-CM

## 2024-03-04 DIAGNOSIS — Z30.011 ENCOUNTER FOR ORAL CONTRACEPTION INITIAL PRESCRIPTION: ICD-10-CM

## 2024-03-04 DIAGNOSIS — N94.6 DYSMENORRHEA: ICD-10-CM

## 2024-03-04 DIAGNOSIS — F33.41 RECURRENT MAJOR DEPRESSIVE DISORDER, IN PARTIAL REMISSION (HCC): ICD-10-CM

## 2024-03-04 RX ORDER — ESCITALOPRAM OXALATE 10 MG/1
10 TABLET ORAL DAILY
Qty: 90 TABLET | Refills: 0 | Status: CANCELLED | OUTPATIENT
Start: 2024-03-04

## 2024-03-05 ENCOUNTER — HOSPITAL ENCOUNTER (OUTPATIENT)
Facility: MEDICAL CENTER | Age: 24
End: 2024-03-05
Attending: PHYSICIAN ASSISTANT

## 2024-03-05 ENCOUNTER — OFFICE VISIT (OUTPATIENT)
Dept: MEDICAL GROUP | Facility: IMAGING CENTER | Age: 24
End: 2024-03-05

## 2024-03-05 VITALS
TEMPERATURE: 97.4 F | OXYGEN SATURATION: 96 % | HEIGHT: 63 IN | SYSTOLIC BLOOD PRESSURE: 110 MMHG | HEART RATE: 67 BPM | BODY MASS INDEX: 32.6 KG/M2 | RESPIRATION RATE: 16 BRPM | DIASTOLIC BLOOD PRESSURE: 60 MMHG | WEIGHT: 184 LBS

## 2024-03-05 DIAGNOSIS — Z11.51 SCREENING FOR HPV (HUMAN PAPILLOMAVIRUS): ICD-10-CM

## 2024-03-05 DIAGNOSIS — Z01.419 ENCOUNTER FOR GYNECOLOGICAL EXAMINATION: ICD-10-CM

## 2024-03-05 DIAGNOSIS — Z12.4 SCREENING FOR CERVICAL CANCER: ICD-10-CM

## 2024-03-05 DIAGNOSIS — F33.1 MODERATE EPISODE OF RECURRENT MAJOR DEPRESSIVE DISORDER (HCC): ICD-10-CM

## 2024-03-05 DIAGNOSIS — F41.9 ANXIETY: ICD-10-CM

## 2024-03-05 DIAGNOSIS — E01.0 THYROMEGALY: ICD-10-CM

## 2024-03-05 DIAGNOSIS — N89.8 VAGINAL DISCHARGE: ICD-10-CM

## 2024-03-05 DIAGNOSIS — Z11.3 SCREENING FOR STD (SEXUALLY TRANSMITTED DISEASE): ICD-10-CM

## 2024-03-05 PROBLEM — N94.6 DYSMENORRHEA: Status: RESOLVED | Noted: 2020-04-30 | Resolved: 2024-03-05

## 2024-03-05 LAB
CANDIDA DNA VAG QL PROBE+SIG AMP: NEGATIVE
G VAGINALIS DNA VAG QL PROBE+SIG AMP: NEGATIVE
T VAGINALIS DNA VAG QL PROBE+SIG AMP: NEGATIVE

## 2024-03-05 PROCEDURE — 87660 TRICHOMONAS VAGIN DIR PROBE: CPT

## 2024-03-05 PROCEDURE — 3078F DIAST BP <80 MM HG: CPT | Performed by: PHYSICIAN ASSISTANT

## 2024-03-05 PROCEDURE — 87591 N.GONORRHOEAE DNA AMP PROB: CPT

## 2024-03-05 PROCEDURE — 87480 CANDIDA DNA DIR PROBE: CPT

## 2024-03-05 PROCEDURE — 87510 GARDNER VAG DNA DIR PROBE: CPT

## 2024-03-05 PROCEDURE — 87491 CHLMYD TRACH DNA AMP PROBE: CPT

## 2024-03-05 PROCEDURE — 1126F AMNT PAIN NOTED NONE PRSNT: CPT | Performed by: PHYSICIAN ASSISTANT

## 2024-03-05 PROCEDURE — 3074F SYST BP LT 130 MM HG: CPT | Performed by: PHYSICIAN ASSISTANT

## 2024-03-05 PROCEDURE — 99395 PREV VISIT EST AGE 18-39: CPT | Performed by: PHYSICIAN ASSISTANT

## 2024-03-05 PROCEDURE — 88175 CYTOPATH C/V AUTO FLUID REDO: CPT

## 2024-03-05 RX ORDER — ESCITALOPRAM OXALATE 10 MG/1
10 TABLET ORAL DAILY
Qty: 90 TABLET | Refills: 0 | Status: SHIPPED | OUTPATIENT
Start: 2024-03-05

## 2024-03-05 RX ORDER — LAMOTRIGINE 25 MG/1
25 TABLET ORAL 2 TIMES DAILY
Qty: 180 TABLET | Refills: 1 | Status: SHIPPED | OUTPATIENT
Start: 2024-03-05

## 2024-03-05 RX ORDER — LEVONORGESTREL AND ETHINYL ESTRADIOL 0.1-0.02MG
1 KIT ORAL
Qty: 28 TABLET | Refills: 3 | Status: SHIPPED | OUTPATIENT
Start: 2024-03-05

## 2024-03-05 ASSESSMENT — PAIN SCALES - GENERAL: PAINLEVEL: NO PAIN

## 2024-03-05 ASSESSMENT — PATIENT HEALTH QUESTIONNAIRE - PHQ9: CLINICAL INTERPRETATION OF PHQ2 SCORE: 0

## 2024-03-05 ASSESSMENT — FIBROSIS 4 INDEX: FIB4 SCORE: 0.34

## 2024-03-05 NOTE — ASSESSMENT & PLAN NOTE
Patient states that her mental health had been controlled on Lexapro and lamotrigine for several months, but she ran out of Lexapro 2 weeks ago.  She states that she noticed a big difference in her mood with being off Lexapro and would like to restart.  States her anxiety has lessened with lamotrigine, feels good on the current dosage.

## 2024-03-05 NOTE — PATIENT INSTRUCTIONS
It was a pleasure meeting with you today at [facility]!    Your medical history/records and medications were reviewed today.     UPDATE on MyChart Results: If you have blood work, and/or imaging studies, or any other test or procedure completed, you will have access to results as soon as they become available in MyChart. Recently, these results will be available for review at the same time that your provider is able to see results!    This will likely mean you will see a result before your provider has had a chance to review and discuss with you.  Some results or care notes may be hard to understand and may be serious in nature.    We look at every result and your provider will contact you to explain what they mean and discuss appropriate next steps. Please allow for at least 72 business hours for chart and result review.     We prefer that you wait for your care team to contact you with your results.  Often, your provider will discuss your results with you at your next appointment. We look forward to continuing to partner with you in your care.    Please review my practice information below:    If you have any prescription refill requests, please send them via [employer] or discuss with your provider at the start of your office visits. Please allow 3-5 business days for lab and testing review and you will be contacted via [employer] with those results, or if advised to make a follow up appointment regarding those results, then please do so.     Once resulted, your lab/test/imaging results will show up automatically in your MyChart. Please wait for my interpretation and recommendations prior to viewing your results to avoid any unnecessary confusion or misinterpretation. I will address all of the lab values that I interpret as abnormal and message you accordingly on your MyChart. I will always send you a message about your results even if they are normal. If you do not hear back from me within 5-7 business  days after completing your tests, then please send me a message on [employer] so I can obtain your results (especially if you went to an outside lab or imaging center - [facility], [facility], etc).     If you have any additional questions or concerns beyond my interpretation of your results, please make an appointment with me to discuss in further detail.    Please only use the [employer] messaging system for questions regarding your most recent appointment or if advised to use otherwise (glucose or blood pressure reporting).     If you have any new problems or concerns, you must make an appointment to discuss. This includes any referral requests, lab requests (unless advised to notify me for pre-appt labs), medication side effects, or request for medication adjustments.     Please arrive 15 minutes prior to your appointment time to complete your check-in and intake with the medical assistant.      Thank you,    Elena Nieves PA-C ([place])  Physician Assistant Certified  [facility]    -----------------------------------------------------------------    Attn: Patients of [facility]:    In an effort to continue to provide excellent and efficient care to our patients, it is vital that we continue to use our resources appropriately. With that, this is a reminder that [employer] is used for prescription refill requests, test results, virtual visits, and chart review only.     Any new questions, concerns/conditions, lab/imaging requests, medication adjustments, new prescriptions, or referral requests do require an appointment (virtually or in person), unless discussed otherwise at your most recent appointment.     Thank you for your understanding,    [facility]

## 2024-03-05 NOTE — TELEPHONE ENCOUNTER
LVM for pt to clarify her appointment on 03/05/24. Pt is scheduled for 20mins and requesting annual and pap. 7:20 slot blocked for pt time.

## 2024-03-05 NOTE — TELEPHONE ENCOUNTER
Received request via: Patient    Was the patient seen in the last year in this department? Yes    Does the patient have an active prescription (recently filled or refills available) for medication(s) requested? No    Pharmacy Name: [id number]    Does the patient have long term Plus and need 100 day supply (blood pressure, diabetes and cholesterol meds only)? Patient does not have SCP

## 2024-03-05 NOTE — PROGRESS NOTES
Subjective:     CC:   Chief Complaint   Patient presents with    Annual Exam    Gynecologic Exam       HPI:   Herman Rosen is a 24 y.o. patient who presents for annual gynecological exam. She is feeling well and denies any complaints.    Moderate episode of recurrent major depressive disorder (HCC)  Patient states that her mental health had been controlled on Lexapro and lamotrigine for several months, but she ran out of Lexapro 2 weeks ago.  She states that she noticed a big difference in her mood with being off Lexapro and would like to restart.  States her anxiety has lessened with lamotrigine, feels good on the current dosage.    Ob-Gyn/ History:    Patient has GYN provider: no  /Para:    Last Pap Smear: N/A  H/O of abnormal pap smears: N/A  Current Contraceptive Method: OCP   Sexually active: yes, male partner  Patient's last menstrual period was 2024 (exact date).  Periods: bleeds 2-3 days, regular  Cramping: none  Folate intake: daily MVT    Screening  --STI Screening: will order   --HIV Screening: negative in the past per pt  --Immunizations:   HPV: completed      has a past medical history of Allergy and Severe episode of recurrent major depressive disorder, without psychotic features (HCC) (3/26/2021).   has no past surgical history on file.  Family History   Problem Relation Age of Onset    GI Disease Mother         Crohn's    Heart Disease Paternal Uncle     Cancer Maternal Grandfather         throat     Heart Disease Maternal Grandfather     Hyperlipidemia Paternal Grandmother      Social History     Socioeconomic History    Marital status: Single     Spouse name: Not on file    Number of children: Not on file    Years of education: Not on file    Highest education level: Not on file   Occupational History    Not on file   Tobacco Use    Smoking status: Never    Smokeless tobacco: Never   Vaping Use    Vaping Use: Never used   Substance and Sexual Activity    Alcohol use: Not  Currently    Drug use: Never    Sexual activity: Not Currently   Other Topics Concern    Not on file   Social History Narrative    Born and raised in [place]    Played softball and basketball growing up    H/O concussions    H/O right shoulder injury s/p PT due to softball     Social Determinants of Health     Financial Resource Strain: Not on file   Food Insecurity: Not on file   Transportation Needs: Not on file   Physical Activity: Not on file   Stress: Not on file   Social Connections: Not on file   Intimate Partner Violence: Not on file   Housing Stability: Not on file     Social History     Social History Narrative    Born and raised in [place]    Played softball and basketball growing up    H/O concussions    H/O right shoulder injury s/p PT due to softball     Patient Active Problem List    Diagnosis Date Noted    Moderate episode of recurrent major depressive disorder (HCC) 05/02/2021    Anxiety 03/26/2021     Current Outpatient Medications   Medication Sig Dispense Refill    escitalopram (LEXAPRO) 10 MG Tab Take 1 Tablet by mouth every day. 90 Tablet 0    traZODone (DESYREL) 50 MG Tab TAKE 1/2 (ONE-HALF) TABLET BY MOUTH AT BEDTIME AS NEEDED FOR SLEEP 45 Tablet 0    levonorgestrel-ethinyl estradiol (SRONYX) 0.1-20 MG-MCG per tablet Take 1 Tablet by mouth every day. 28 Tablet 3    lamoTRIgine (LAMICTAL) 25 MG Tab Take 1 Tablet by mouth 2 times a day. 180 Tablet 1    Probiotic Product (PROBIOTIC PO) Take  by mouth.       No current facility-administered medications for this visit.     Allergies   Allergen Reactions    Seasonal Itching       Review of Systems   Constitutional: Negative for fever, chills and malaise/fatigue.    Respiratory: Negative for cough and shortness of breath.  Cardiovascular: Negative for chest pain, palpitations, or leg swelling.   Gastrointestinal: Negative for nausea, vomiting, abdominal pain and diarrhea.   Genitourinary: Negative for dysuria and hematuria.   Skin: Negative for rash.   "  Psychiatric/Behavioral: Negative for depression.      Objective:     /60 (BP Location: Right arm, Patient Position: Sitting, BP Cuff Size: Adult)   Pulse 67   Temp 36.3 °C (97.4 °F) (Temporal)   Resp 16   Ht 1.6 m (5' 3\")   Wt 83.5 kg (184 lb)   LMP 02/19/2024 (Exact Date)   SpO2 96%   BMI 32.59 kg/m²   Body mass index is 32.59 kg/m².  Wt Readings from Last 4 Encounters:   03/05/24 83.5 kg (184 lb)   07/03/23 84.8 kg (187 lb)   03/29/22 74.8 kg (165 lb)   04/29/21 71.2 kg (157 lb)     Physical Exam:  Constitutional: Well-developed and well-nourished. Not diaphoretic. No distress.   Skin: Skin is warm and dry. No rash noted.  Head: Atraumatic without lesions.  Neck: Supple, trachea midline. Normal range of motion. + thyromegaly present. No lymphadenopathy--cervical or supraclavicular.  Cardiovascular: Regular rate and rhythm, S1 and S2 without murmur, rubs, or gallops.  Lungs: Normal inspiratory effort, CTA bilaterally, no wheezes/rhonchi/rales    Breast: deferred  Abdomen: Soft, non tender, and without distention. Active bowel sounds in all four quadrants. No rebound, guarding, masses or HSM.    :Perineum and external genitalia normal without rash.   Vagina with white and thick discharge.   Cervix without visible lesions or discharge.   Bimanual exam without adnexal masses or cervical motion tenderness.    Extremities: No cyanosis, clubbing, erythema, nor edema. Distal pulses intact and symmetric.     A chaperone was offered to the patient during today's exam. Chaperone name: Ruiz MA was present.    Assessment and Plan:     1. Screening for cervical cancer  - Thinprep Pap, Reflex HPV on ASC-US and Above; Future    2. Encounter for gynecological examination  - Thinprep Pap, Reflex HPV on ASC-US and Above; Future    3. Screening for HPV (human papillomavirus)  - Thinprep Pap, Reflex HPV on ASC-US and Above; Future    4. Screening for STD (sexually transmitted disease)  - Chlamydia/GC, PCR " (Genital/Anal swab); Future    5. Anxiety  Chronic, controlled and stable.  Continue lamotrigine.  Restart Lexapro.  - escitalopram (LEXAPRO) 10 MG Tab; Take 1 Tablet by mouth every day.  Dispense: 90 Tablet; Refill: 0    6. Moderate episode of recurrent major depressive disorder (HCC)  Chronic, uncontrolled.  Restart Lexapro, may take half tab for the first 3 days then increase to full tab as tolerated.  Side effects may include nausea and diarrhea.  Reassess in 2-3 weeks if the need arises.  - escitalopram (LEXAPRO) 10 MG Tab; Take 1 Tablet by mouth every day.  Dispense: 90 Tablet; Refill: 0    7. Thyromegaly  - US-THYROID; Future    8. Vaginal discharge  - VAGINAL PATHOGENS DNA PANEL; Future      Follow-up: Return in about 3 months (around 5/31/2024) for Follow-up, Medication recheck.    Elena Nieves PA-C ([place])  Physician Assistant Certified  [facility]

## 2024-03-06 LAB
C TRACH DNA GENITAL QL NAA+PROBE: NEGATIVE
N GONORRHOEA DNA GENITAL QL NAA+PROBE: NEGATIVE
SPECIMEN SOURCE: NORMAL

## 2024-03-12 LAB
COMMENT NL11729A: NORMAL
CYTOLOGIST CVX/VAG CYTO: NORMAL
CYTOLOGY CVX/VAG DOC CYTO: NORMAL
CYTOLOGY CVX/VAG DOC THIN PREP: NORMAL
NOTE NL11727A: NORMAL
OTHER STN SPEC: NORMAL
QC REVIEWED BY NL11722A: NORMAL
STAT OF ADQ CVX/VAG CYTO-IMP: NORMAL

## 2024-03-18 ENCOUNTER — APPOINTMENT (OUTPATIENT)
Dept: MEDICAL GROUP | Facility: IMAGING CENTER | Age: 24
End: 2024-03-18
Payer: COMMERCIAL

## 2024-06-04 DIAGNOSIS — F33.1 MODERATE EPISODE OF RECURRENT MAJOR DEPRESSIVE DISORDER (HCC): ICD-10-CM

## 2024-06-04 DIAGNOSIS — F41.9 ANXIETY: ICD-10-CM

## 2024-06-05 NOTE — TELEPHONE ENCOUNTER
Received request via: Pharmacy    Was the patient seen in the last year in this department? Yes    Does the patient have an active prescription (recently filled or refills available) for medication(s) requested? No    [facility]     Does the patient have snf Plus and need 100 day supply (blood pressure, diabetes and cholesterol meds only)? Patient does not have SCP

## 2024-06-06 ENCOUNTER — APPOINTMENT (OUTPATIENT)
Dept: MEDICAL GROUP | Facility: IMAGING CENTER | Age: 24
End: 2024-06-06

## 2024-06-06 RX ORDER — ESCITALOPRAM OXALATE 10 MG/1
10 TABLET ORAL DAILY
Qty: 90 TABLET | Refills: 1 | Status: SHIPPED | OUTPATIENT
Start: 2024-06-06

## 2024-06-27 DIAGNOSIS — N94.6 DYSMENORRHEA: ICD-10-CM

## 2024-06-27 DIAGNOSIS — Z30.011 ENCOUNTER FOR ORAL CONTRACEPTION INITIAL PRESCRIPTION: ICD-10-CM

## 2024-06-27 RX ORDER — LEVONORGESTREL AND ETHINYL ESTRADIOL 0.1-0.02MG
1 KIT ORAL
Qty: 84 TABLET | Refills: 1 | Status: SHIPPED | OUTPATIENT
Start: 2024-06-27

## 2024-06-27 NOTE — TELEPHONE ENCOUNTER
Received request via: Pharmacy    Was the patient seen in the last year in this department? Yes    Does the patient have an active prescription (recently filled or refills available) for medication(s) requested? No    Pharmacy Name: [facility]     Does the patient have [facility] and need 100 day supply (blood pressure, diabetes and cholesterol meds only)? Patient does not have SCP

## 2025-10-28 ENCOUNTER — APPOINTMENT (OUTPATIENT)
Dept: MEDICAL GROUP | Facility: IMAGING CENTER | Age: 25
End: 2025-10-28